# Patient Record
Sex: FEMALE | Race: WHITE | Employment: FULL TIME | URBAN - METROPOLITAN AREA
[De-identification: names, ages, dates, MRNs, and addresses within clinical notes are randomized per-mention and may not be internally consistent; named-entity substitution may affect disease eponyms.]

---

## 2024-01-18 ENCOUNTER — OFFICE VISIT (OUTPATIENT)
Dept: PODIATRY | Age: 34
End: 2024-01-18
Payer: COMMERCIAL

## 2024-01-18 VITALS — HEIGHT: 66 IN | WEIGHT: 220 LBS | BODY MASS INDEX: 35.36 KG/M2

## 2024-01-18 DIAGNOSIS — M79.604 PAIN IN BOTH LOWER EXTREMITIES: ICD-10-CM

## 2024-01-18 DIAGNOSIS — L60.0 INGROWN NAIL: ICD-10-CM

## 2024-01-18 DIAGNOSIS — L85.3 XEROSIS CUTIS: Primary | ICD-10-CM

## 2024-01-18 DIAGNOSIS — M79.605 PAIN IN BOTH LOWER EXTREMITIES: ICD-10-CM

## 2024-01-18 PROCEDURE — G8484 FLU IMMUNIZE NO ADMIN: HCPCS | Performed by: PODIATRIST

## 2024-01-18 PROCEDURE — 99203 OFFICE O/P NEW LOW 30 MIN: CPT | Performed by: PODIATRIST

## 2024-01-18 PROCEDURE — G8427 DOCREV CUR MEDS BY ELIG CLIN: HCPCS | Performed by: PODIATRIST

## 2024-01-18 PROCEDURE — 1036F TOBACCO NON-USER: CPT | Performed by: PODIATRIST

## 2024-01-18 PROCEDURE — G8417 CALC BMI ABV UP PARAM F/U: HCPCS | Performed by: PODIATRIST

## 2024-01-18 RX ORDER — UREA 40 %
CREAM (GRAM) TOPICAL
Qty: 1 EACH | Refills: 2 | Status: SHIPPED | OUTPATIENT
Start: 2024-01-18

## 2024-01-18 RX ORDER — ACETAMINOPHEN 325 MG/1
500 TABLET ORAL EVERY 4 HOURS
COMMUNITY

## 2024-01-18 NOTE — PROGRESS NOTES
[facility] [place] [facility]  [address] [place], [place] [id number]  Dept: [phone]    NEW PATIENT PROGRESS NOTE  Date of patient's visit: 1/18/2024  Patient's Name:  Elin Gentile YOB: 1990            Patient Care Team:  Karina Magana DO as PCP - General (Family Medicine)  Nasrin Castellanos DPM as Surgeon (Podiatry)        Chief Complaint   Patient presents with    New Patient     Establish care    Nail Problem     Toenail fungus x several years  [place]iat[place] treatment [place]    Ingrown Toenail     Bl great toes x 2 years         HPI:   Elin Gentile is a 33 y.o. female who presents to the office today complaining of toenail fungus and ingrown toenails bilateral great toes.  Symptoms began 2 year(s) ago. Patient relates pain is Absent .  Pain is rated 0 out of 10 and is described as none.  Treatments prior to today's visit include: applying vixx to nails.  Currently denies F/C/N/V. Pt's primary care physician is Karina Magana DO      No Known Allergies    No past medical history on file.    Prior to Admission medications    Medication Sig Start Date End Date Taking? Authorizing Provider   acetaminophen (TYLENOL) 325 MG tablet Take 500 mg by mouth every 4 hours    Provider, MD Claire       No past surgical history on file.    No family history on file.    Social History     Tobacco Use    Smoking status: Never    Smokeless tobacco: Never   Substance Use Topics    Alcohol use: Not on file       Review of Systems    Review of Systems:   History obtained from chart review and the patient  General ROS: negative for - chills, fatigue, fever, night sweats or weight gain  Constitutional: Negative for chills, diaphoresis, fatigue, fever and unexpected weight change.  Musculoskeletal: Positive for arthralgias, gait problem and joint swelling.  Neurological ROS: negative for - behavioral changes, confusion, headaches or